# Patient Record
Sex: MALE | Race: BLACK OR AFRICAN AMERICAN
[De-identification: names, ages, dates, MRNs, and addresses within clinical notes are randomized per-mention and may not be internally consistent; named-entity substitution may affect disease eponyms.]

---

## 2022-08-04 ENCOUNTER — P2P PATIENT RECORD (OUTPATIENT)
Age: 47
End: 2022-08-04

## 2022-08-08 ENCOUNTER — OFFICE VISIT (OUTPATIENT)
Dept: URBAN - METROPOLITAN AREA CLINIC 92 | Facility: CLINIC | Age: 47
End: 2022-08-08

## 2022-10-11 ENCOUNTER — OFFICE VISIT (OUTPATIENT)
Dept: URBAN - METROPOLITAN AREA CLINIC 17 | Facility: CLINIC | Age: 47
End: 2022-10-11

## 2022-10-31 ENCOUNTER — CLAIMS CREATED FROM THE CLAIM WINDOW (OUTPATIENT)
Dept: URBAN - METROPOLITAN AREA CLINIC 17 | Facility: CLINIC | Age: 47
End: 2022-10-31
Payer: MEDICARE

## 2022-10-31 ENCOUNTER — WEB ENCOUNTER (OUTPATIENT)
Dept: URBAN - METROPOLITAN AREA CLINIC 17 | Facility: CLINIC | Age: 47
End: 2022-10-31

## 2022-10-31 ENCOUNTER — DASHBOARD ENCOUNTERS (OUTPATIENT)
Age: 47
End: 2022-10-31

## 2022-10-31 VITALS
HEIGHT: 68 IN | SYSTOLIC BLOOD PRESSURE: 125 MMHG | TEMPERATURE: 97.4 F | BODY MASS INDEX: 29.52 KG/M2 | HEART RATE: 78 BPM | WEIGHT: 194.8 LBS | DIASTOLIC BLOOD PRESSURE: 82 MMHG

## 2022-10-31 DIAGNOSIS — R10.13 MIDEPIGASTRIC PAIN: ICD-10-CM

## 2022-10-31 DIAGNOSIS — Z12.11 SCREEN FOR COLON CANCER: ICD-10-CM

## 2022-10-31 DIAGNOSIS — G40.909 SEIZURE DISORDER: ICD-10-CM

## 2022-10-31 DIAGNOSIS — R68.81 EARLY SATIETY: ICD-10-CM

## 2022-10-31 DIAGNOSIS — E55.9 VITAMIN D DEFICIENCY DISEASE: ICD-10-CM

## 2022-10-31 DIAGNOSIS — K92.89 GAS BLOAT SYNDROME: ICD-10-CM

## 2022-10-31 DIAGNOSIS — E11.9 TYPE 2 DIABETES MELLITUS WITHOUT COMPLICATION, WITHOUT LONG-TERM CURRENT USE OF INSULIN: ICD-10-CM

## 2022-10-31 PROBLEM — 34713006: Status: ACTIVE | Noted: 2022-10-31

## 2022-10-31 PROBLEM — 313436004: Status: ACTIVE | Noted: 2022-10-31

## 2022-10-31 PROBLEM — 128613002: Status: ACTIVE | Noted: 2022-10-31

## 2022-10-31 PROCEDURE — 99244 OFF/OP CNSLTJ NEW/EST MOD 40: CPT | Performed by: INTERNAL MEDICINE

## 2022-10-31 PROCEDURE — 99204 OFFICE O/P NEW MOD 45 MIN: CPT | Performed by: INTERNAL MEDICINE

## 2022-10-31 RX ORDER — OMEPRAZOLE 40 MG/1
1 CAPSULE 30 MINUTES BEFORE MORNING MEAL CAPSULE, DELAYED RELEASE PELLETS ORAL ONCE A DAY
Qty: 90 | Refills: 3 | OUTPATIENT
Start: 2022-10-31

## 2022-12-27 PROBLEM — 79922009: Status: ACTIVE | Noted: 2022-12-27

## 2022-12-27 PROBLEM — 305058001: Status: ACTIVE | Noted: 2022-12-27

## 2023-01-31 ENCOUNTER — OFFICE VISIT (OUTPATIENT)
Dept: URBAN - METROPOLITAN AREA SURGERY CENTER 16 | Facility: SURGERY CENTER | Age: 48
End: 2023-01-31

## 2025-02-08 NOTE — HPI-TODAY'S VISIT:
The patient has a history of type 2 DM times 18 years, HTN and seizure disorder for evaluation of epigastric pain with associated n/v. The pt describes a midepigastric Knot and fullness and he notes that he fills up early when eating. He denies melena, hematemesis or hematochezia. The pt notes that he has had a recent sinus infection and he notes that he does not eat healthy and he does not know the hemoglobin A1C. The pt notes that he has not had a colon as well.
Opt out